# Patient Record
Sex: FEMALE | Race: WHITE | URBAN - METROPOLITAN AREA
[De-identification: names, ages, dates, MRNs, and addresses within clinical notes are randomized per-mention and may not be internally consistent; named-entity substitution may affect disease eponyms.]

---

## 2020-07-11 ENCOUNTER — EMERGENCY (EMERGENCY)
Facility: HOSPITAL | Age: 75
LOS: 0 days | Discharge: HOME | End: 2020-07-11
Attending: EMERGENCY MEDICINE
Payer: COMMERCIAL

## 2020-07-11 VITALS
OXYGEN SATURATION: 99 % | RESPIRATION RATE: 16 BRPM | SYSTOLIC BLOOD PRESSURE: 135 MMHG | WEIGHT: 110.01 LBS | HEIGHT: 61 IN | TEMPERATURE: 99 F | DIASTOLIC BLOOD PRESSURE: 72 MMHG | HEART RATE: 68 BPM
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
## 2020-07-11 DIAGNOSIS — M54.2 CERVICALGIA: ICD-10-CM
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
## 2020-07-11 DIAGNOSIS — S16.1XXA STRAIN OF MUSCLE, FASCIA AND TENDON AT NECK LEVEL, INITIAL ENCOUNTER: ICD-10-CM

## 2020-07-11 PROCEDURE — 99284 EMERGENCY DEPT VISIT MOD MDM: CPT | Mod: 25

## 2020-07-11 PROCEDURE — 72125 CT NECK SPINE W/O DYE: CPT

## 2020-07-11 PROCEDURE — 72125 CT NECK SPINE W/O DYE: CPT | Mod: 26

## 2020-07-11 PROCEDURE — 99284 EMERGENCY DEPT VISIT MOD MDM: CPT

## 2020-07-11 RX ORDER — ACETAMINOPHEN 500 MG
1000 TABLET ORAL ONCE
Refills: 0 | Status: COMPLETED | OUTPATIENT
Start: 2020-07-11 | End: 2020-07-11

## 2020-07-11 RX ADMIN — Medication 1000 MILLIGRAM(S): at 15:37
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
## 2020-07-11 NOTE — ED STATDOCS - CLINICAL SUMMARY MEDICAL DECISION MAKING FREE TEXT BOX
CT cervical spine negative.  No other traumatic injuries.  C spine cleared.  D/c home with supportive care. CT cervical spine negative.  No other traumatic injuries.  C spine cleared.  D/c home with supportive care.    PA note: All labwork and studies reviewed in details with patient. Patient re-examined and re-evaluated. Patient feels much better at this time. ED evaluation, Diagnosis and management discussed with the patient in detail. Workup results discussed with ED attending, OK to [place] home. Close PMD follow up encouraged.  Strict ED return instructions discussed in detail and patient given the opportunity to ask any questions about their discharge diagnosis and instructions. Patient verbalized understanding. ~ Manny Gross PA-C
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
## 2020-07-11 NOTE — ED STATDOCS - PHYSICAL EXAMINATION
PA NOTE: GEN: AOX3, NAD. HEENT: Throat clear. Airway is patent. EYES: PERRLA. EOMI. Head: NC/AT. NECK: +Mild muscular tenderness paravertebral left cervical area. C-spine non-tender. CV:S1S2, RRR, LUNGS: Non-labored breathing, no tachypnea. O2sat 100% RA. CTA b/l. No w/r/r. CHEST: Equal chest expansion and rise. No deformity. ABD: Soft, NT/ND, no rebound, no guarding. No CVAT. EXT: No e/c/c. 2+ distal pulses. SKIN: No rashes. NEURO: No focal deficits. CN II-XII intact. FROM. 5/5 motor and sensory. ~Manny Gross PA-C

## 2020-07-11 NOTE — ED STATDOCS - NSFOLLOWUPINSTRUCTIONS_ED_ALL_ED_FT
Cervical Sprain     A cervical sprain is a stretch or tear in one or more of the tough, cord-like tissues that connect bones (ligaments) in the neck. Cervical sprains can range from mild to severe. Severe cervical sprains can cause the spinal bones (vertebrae) in the neck to be unstable. This can lead to spinal cord damage and can result in serious nervous system problems.  The amount of time that it takes for a cervical sprain to get better depends on the cause and extent of the injury. Most cervical sprains heal in 4–6 weeks.  What are the causes?  Cervical sprains may be caused by an injury (trauma), such as from a motor vehicle accident, a fall, or sudden forward and backward whipping movement of the head and neck (whiplash injury).  Mild cervical sprains may be caused by wear and tear over time, such as from poor posture, sitting in a chair that does not provide support, or looking up or down for long periods of time.  What increases the risk?  The following factors may make you more likely to develop this condition:  Participating in activities that have a high risk of trauma to the neck. These include contact sports, auto racing, gymnastics, and diving.Taking risks when driving or riding in a motor vehicle, such as speeding.Having osteoarthritis of the spine.Having poor strength and flexibility of the neck.A previous neck injury.Having poor posture.Spending a lot of time in certain positions that put stress on the neck, such as sitting at a computer for long periods of time.What are the signs or symptoms?  Symptoms of this condition include:  Pain, soreness, stiffness, tenderness, swelling, or a burning sensation in the front, back, or sides of the neck.Sudden tightening of neck muscles that you cannot control (muscle spasms).Pain in the shoulders or upper back.Limited ability to move the neck.Headache.Dizziness.Nausea.Vomiting.Weakness, numbness, or tingling in a hand or an arm.Symptoms may develop right away after injury, or they may develop over a few days. In some cases, symptoms may go away with treatment and return (recur) over time.  How is this diagnosed?  This condition may be diagnosed based on:  Your medical history.Your symptoms.Any recent injuries or known neck problems that you have, such as arthritis in the neck.A physical exam.Imaging tests, such as:  X-rays.MRI.CT scan.How is this treated?  This condition is treated by resting and icing the injured area and doing physical therapy exercises. Depending on the severity of your condition, treatment may also include:  Keeping your neck in place (immobilized) for periods of time. This may be done using:  A cervical collar. This supports your chin and the back of your head.A cervical traction device. This is a sling that holds up your head. This removes weight and pressure from your neck, and it may help to relieve pain.Medicines that help to relieve pain and inflammation.Medicines that help to relax your muscles (muscle relaxants).Surgery. This is rare.Follow these instructions at home:  If you have a cervical collar:        Wear it as told by your health care provider. Do not remove the collar unless instructed by your health care provider.Ask your health care provider before you make any adjustments to your collar.If you have long hair, keep it outside of the collar.Ask your health care provider if you can remove the collar for cleaning and bathing. If you are allowed to remove the collar for cleaning or bathing:  Follow instructions from your health care provider about how to remove the collar safely.Clean the collar by wiping it with mild soap and water and drying it completely.If your collar has removable pads, remove them every 1–2 days and wash them by hand with soap and water. Let them air-dry completely before you put them back in the collar.Check your skin under the collar for irritation or sores. If you see any, tell your health care provider.Managing pain, stiffness, and swelling        If directed, use a cervical traction device as told by your health care provider.If directed, apply heat to the affected area before you do your physical therapy or as often as told by your health care provider. Use the heat source that your health care provider recommends, such as a moist heat pack or a heating pad.  Place a towel between your skin and the heat source.Leave the heat on for 20–30 minutes.Remove the heat if your skin turns bright red. This is especially important if you are unable to feel pain, heat, or cold. You may have a greater risk of getting burned.If directed, put ice on the affected area:  Put ice in a plastic bag.Place a towel between your skin and the bag.Leave the ice on for 20 minutes, 2–3 times a day.Activity     Do not drive while wearing a cervical collar. If you do not have a cervical collar, ask your health care provider if it is safe to drive while your neck heals.Do not drive or use heavy machinery while taking prescription pain medicine or muscle relaxants, unless your health care provider approves.Do not lift anything that is heavier than 10 lb (4.5 kg) until your health care provider tells you that it is safe.Rest as directed by your health care provider. Avoid positions and activities that make your symptoms worse. Ask your health care provider what activities are safe for you.If physical therapy was prescribed, do exercises as told by your health care provider or physical therapist.General instructions     Take over-the-counter and prescription medicines only as told by your health care provider.Do not use any products that contain nicotine or tobacco, such as cigarettes and e-cigarettes. These can delay healing. If you need help quitting, ask your health care provider.Keep all follow-up visits as told by your health care provider or physical therapist. This is important.How is this prevented?  To prevent a cervical sprain from happening again:  Use and maintain good posture. Make any needed adjustments to your workstation to help you use good posture.Exercise regularly as directed by your health care provider or physical therapist.Avoid risky activities that may cause a cervical sprain.Contact a health care provider if:  You have symptoms that get worse or do not get better after 2 weeks of treatment.You have pain that gets worse or does not get better with medicine.You develop new, unexplained symptoms.You have sores or irritated skin on your neck from wearing your cervical collar.Get help right away if:  You have severe pain.You develop numbness, tingling, or weakness in any part of your body.You cannot move a part of your body (you have paralysis).You have neck pain along with:  Severe dizziness.Headache.Summary  A cervical sprain is a stretch or tear in one or more of the tough, cord-like tissues that connect bones (ligaments) in the neck.Cervical sprains may be caused by an injury (trauma), such as from a motor vehicle accident, a fall, or sudden forward and backward whipping movement of the head and neck (whiplash injury).Symptoms may develop right away after injury, or they may develop over a few days.This condition is treated by resting and icing the injured area and doing physical therapy exercises.This information is not intended to replace advice given to you by your health care provider. Make sure you discuss any questions you have with your health care provider.

## 2020-07-11 NOTE — ED STATDOCS - PATIENT PORTAL LINK FT
You can access the FollowMyHealth Patient Portal offered by [facility] by registering at the following website: [email or web address]. By joining [employer]’s FollowMyHealth portal, you will also be able to view your health information using other applications (apps) compatible with our system.
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
## 2020-07-11 NOTE — ED STATDOCS - CARE PROVIDER_API CALL
[facility]  NEUROSURGERY  [address] [place] [address]  Phone: [phone]  Fax: [phone]  Follow Up Time: Routine

## 2024-12-11 ENCOUNTER — APPOINTMENT (OUTPATIENT)
Dept: NEUROLOGY | Facility: CLINIC | Age: 79
End: 2024-12-11

## 2024-12-18 ENCOUNTER — APPOINTMENT (OUTPATIENT)
Dept: NEUROLOGY | Facility: CLINIC | Age: 79
End: 2024-12-18